# Patient Record
Sex: MALE | Race: WHITE | NOT HISPANIC OR LATINO | URBAN - METROPOLITAN AREA
[De-identification: names, ages, dates, MRNs, and addresses within clinical notes are randomized per-mention and may not be internally consistent; named-entity substitution may affect disease eponyms.]

---

## 2018-01-23 ENCOUNTER — APPOINTMENT (OUTPATIENT)
Age: 53
Setting detail: DERMATOLOGY
End: 2018-01-30

## 2018-01-23 DIAGNOSIS — L72.8 OTHER FOLLICULAR CYSTS OF THE SKIN AND SUBCUTANEOUS TISSUE: ICD-10-CM

## 2018-01-23 PROCEDURE — OTHER ADDITIONAL NOTES: OTHER

## 2018-01-23 PROCEDURE — OTHER OTHER: OTHER

## 2018-01-23 PROCEDURE — OTHER FOLLOW UP ORDERS: OTHER

## 2018-01-23 PROCEDURE — 99202 OFFICE O/P NEW SF 15 MIN: CPT

## 2018-01-23 PROCEDURE — OTHER COUNSELING: OTHER

## 2018-01-23 ASSESSMENT — LOCATION ZONE DERM: LOCATION ZONE: GENITALIA

## 2018-01-23 ASSESSMENT — LOCATION SIMPLE DESCRIPTION DERM: LOCATION SIMPLE: SCROTUM

## 2018-01-23 ASSESSMENT — LOCATION DETAILED DESCRIPTION DERM: LOCATION DETAILED: LEFT ANTERIOR SCROTUM

## 2018-01-23 NOTE — HPI: CYST
Is This A New Presentation, Or A Follow-Up?: Cyst
Additional History: Patient states this area began as a small itch which he thought was an ingrown hair. It then grew to a large bump and began to increasingly itch and is tender. Patient states he has never expressed this bump. He  states he [facility] this [facility] [place] in [facility] of [facility]. Patient states this doctor gave him an anti-itch medication and advised him this area would have to be surgically removed.

## 2018-01-23 NOTE — PROCEDURE: OTHER
Other (Free Text): evaluated with [facility]. offered to remove today however pt cannot pay for the procedure currently. pt will schedule when this is possible. pre-surgery instructions were given. pt acknowledged understanding
Note Text (......Xxx Chief Complaint.): This diagnosis correlates with the
Detail Level: Zone

## 2018-01-23 NOTE — PROCEDURE: FOLLOW UP ORDERS
Follow-Up Preamble: The following orders were made during the visit: excision w simple
Detail Level: Zone

## 2018-01-23 NOTE — PROCEDURE: ADDITIONAL NOTES
Additional Notes: Patient is a cash patient. Quoted $183 for removal and $100 for pathology. Estimating approx $283 total